# Patient Record
Sex: MALE | Race: BLACK OR AFRICAN AMERICAN | NOT HISPANIC OR LATINO | URBAN - METROPOLITAN AREA
[De-identification: names, ages, dates, MRNs, and addresses within clinical notes are randomized per-mention and may not be internally consistent; named-entity substitution may affect disease eponyms.]

---
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
## 2018-06-17 ENCOUNTER — EMERGENCY (EMERGENCY)
Facility: HOSPITAL | Age: 18
LOS: 1 days | Discharge: HOME | End: 2018-06-17
Attending: EMERGENCY MEDICINE
Payer: COMMERCIAL

## 2018-06-17 VITALS
HEIGHT: 67 IN | DIASTOLIC BLOOD PRESSURE: 79 MMHG | TEMPERATURE: 97 F | WEIGHT: 154.98 LBS | OXYGEN SATURATION: 100 % | SYSTOLIC BLOOD PRESSURE: 141 MMHG | HEART RATE: 55 BPM | RESPIRATION RATE: 16 BRPM

## 2018-06-17 RX ORDER — TETANUS TOXOID, REDUCED DIPHTHERIA TOXOID AND ACELLULAR PERTUSSIS VACCINE, ADSORBED 5; 2.5; 8; 8; 2.5 [IU]/.5ML; [IU]/.5ML; UG/.5ML; UG/.5ML; UG/.5ML
0.5 SUSPENSION INTRAMUSCULAR ONCE
Qty: 0 | Refills: 0 | Status: COMPLETED | OUTPATIENT
Start: 2018-06-17 | End: 2018-06-17

## 2018-06-17 RX ORDER — IBUPROFEN 200 MG
600 TABLET ORAL ONCE
Qty: 0 | Refills: 0 | Status: COMPLETED | OUTPATIENT
Start: 2018-06-17 | End: 2018-06-17

## 2018-06-17 RX ADMIN — Medication 600 MILLIGRAM(S): at 21:33

## 2018-06-17 RX ADMIN — TETANUS TOXOID, REDUCED DIPHTHERIA TOXOID AND ACELLULAR PERTUSSIS VACCINE, ADSORBED 0.5 MILLILITER(S): 5; 2.5; 8; 8; 2.5 SUSPENSION INTRAMUSCULAR at 21:32

## 2018-06-17 NOTE — ED ADULT NURSE NOTE - CHPI ED SYMPTOMS NEG
no purulent drainage/no redness/no red streaks/no bleeding at site/no fever/no vomiting/no drainage/no bleeding/no chills/no pain

## 2018-06-17 NOTE — ED ADULT NURSE NOTE - OBJECTIVE STATEMENT
pt is a 17 yr M presents with laceration to L thumb, pt was cleaning a glass when it broke and cut his finger. bleeding controlled. tdap not up to date. no medical hx. no deformity. cap refill < 3 secs

## 2018-06-18 PROBLEM — L30.9 DERMATITIS, UNSPECIFIED: Chronic | Status: ACTIVE | Noted: 2017-09-19

## 2018-06-18 NOTE — ED PROVIDER NOTE - OBJECTIVE STATEMENT
18yo male pt, no PMHx, ambulatory c/o left thumb laceration by broken glass at work prior to arrival. Denies other injuries. Denies sensory changes or weakness to extremities. Unknown TD.

## 2018-06-18 NOTE — ED PROVIDER NOTE - MEDICAL DECISION MAKING DETAILS
ANTONIO Hope MD: Pt is a 18 y/o with no sig PMHx who p/w c/o left thumb laceration. States that it happened while he was cleaning a glass that broke while at work. Endorses some paresthesias at the tip of the finger, no limitation in ROM. Last tetanus unknown. Plan: xray to r/o retained FB, Adacel, suture laceration and provide outpt f/u instructions.

## 2018-06-18 NOTE — ED PROVIDER NOTE - PHYSICAL EXAMINATION
NAD, VSS, Afebrile, + Left thumb, the volar aspect of tip 2cm superficial (vertical) laceration, no active bleeding. N/V- intact.

## 2020-12-12 ENCOUNTER — TRANSCRIPTION ENCOUNTER (OUTPATIENT)
Age: 20
End: 2020-12-12

## 2020-12-13 ENCOUNTER — EMERGENCY (EMERGENCY)
Facility: HOSPITAL | Age: 20
LOS: 1 days | Discharge: HOME | End: 2020-12-13
Attending: EMERGENCY MEDICINE
Payer: MEDICARE

## 2020-12-13 VITALS
TEMPERATURE: 98 F | DIASTOLIC BLOOD PRESSURE: 70 MMHG | WEIGHT: 167.99 LBS | HEART RATE: 55 BPM | OXYGEN SATURATION: 97 % | RESPIRATION RATE: 18 BRPM | SYSTOLIC BLOOD PRESSURE: 110 MMHG | HEIGHT: 67 IN

## 2020-12-13 LAB
ALBUMIN SERPL ELPH-MCNC: 4.7 G/DL — SIGNIFICANT CHANGE UP (ref 3.3–5)
ALP SERPL-CCNC: 62 U/L — SIGNIFICANT CHANGE UP (ref 40–120)
ALT FLD-CCNC: 14 U/L — SIGNIFICANT CHANGE UP (ref 10–45)
ANION GAP SERPL CALC-SCNC: 11 MMOL/L — SIGNIFICANT CHANGE UP (ref 5–17)
AST SERPL-CCNC: 15 U/L — SIGNIFICANT CHANGE UP (ref 10–40)
BASOPHILS # BLD AUTO: 0.03 K/UL — SIGNIFICANT CHANGE UP (ref 0–0.2)
BASOPHILS NFR BLD AUTO: 0.4 % — SIGNIFICANT CHANGE UP (ref 0–2)
BILIRUB SERPL-MCNC: 0.5 MG/DL — SIGNIFICANT CHANGE UP (ref 0.2–1.2)
BUN SERPL-MCNC: 9 MG/DL — SIGNIFICANT CHANGE UP (ref 7–23)
CALCIUM SERPL-MCNC: 9.7 MG/DL — SIGNIFICANT CHANGE UP (ref 8.4–10.5)
CHLORIDE SERPL-SCNC: 102 MMOL/L — SIGNIFICANT CHANGE UP (ref 96–108)
CO2 SERPL-SCNC: 26 MMOL/L — SIGNIFICANT CHANGE UP (ref 22–31)
CREAT SERPL-MCNC: 0.95 MG/DL — SIGNIFICANT CHANGE UP (ref 0.5–1.3)
CRP SERPL-MCNC: 0.76 MG/DL — HIGH (ref 0–0.4)
EOSINOPHIL # BLD AUTO: 0.16 K/UL — SIGNIFICANT CHANGE UP (ref 0–0.5)
EOSINOPHIL NFR BLD AUTO: 2.1 % — SIGNIFICANT CHANGE UP (ref 0–6)
GLUCOSE SERPL-MCNC: 104 MG/DL — HIGH (ref 70–99)
HCT VFR BLD CALC: 43.8 % — SIGNIFICANT CHANGE UP (ref 39–50)
HGB BLD-MCNC: 14 G/DL — SIGNIFICANT CHANGE UP (ref 13–17)
HIV 1 & 2 AB SERPL IA.RAPID: SIGNIFICANT CHANGE UP
IMM GRANULOCYTES NFR BLD AUTO: 0.1 % — SIGNIFICANT CHANGE UP (ref 0–1.5)
LYMPHOCYTES # BLD AUTO: 1.31 K/UL — SIGNIFICANT CHANGE UP (ref 1–3.3)
LYMPHOCYTES # BLD AUTO: 17.5 % — SIGNIFICANT CHANGE UP (ref 13–44)
MCHC RBC-ENTMCNC: 27.2 PG — SIGNIFICANT CHANGE UP (ref 27–34)
MCHC RBC-ENTMCNC: 32 GM/DL — SIGNIFICANT CHANGE UP (ref 32–36)
MCV RBC AUTO: 85 FL — SIGNIFICANT CHANGE UP (ref 80–100)
MONOCYTES # BLD AUTO: 0.56 K/UL — SIGNIFICANT CHANGE UP (ref 0–0.9)
MONOCYTES NFR BLD AUTO: 7.5 % — SIGNIFICANT CHANGE UP (ref 2–14)
NEUTROPHILS # BLD AUTO: 5.4 K/UL — SIGNIFICANT CHANGE UP (ref 1.8–7.4)
NEUTROPHILS NFR BLD AUTO: 72.4 % — SIGNIFICANT CHANGE UP (ref 43–77)
NRBC # BLD: 0 /100 WBCS — SIGNIFICANT CHANGE UP (ref 0–0)
PLATELET # BLD AUTO: 242 K/UL — SIGNIFICANT CHANGE UP (ref 150–400)
POTASSIUM SERPL-MCNC: 3.7 MMOL/L — SIGNIFICANT CHANGE UP (ref 3.5–5.3)
POTASSIUM SERPL-SCNC: 3.7 MMOL/L — SIGNIFICANT CHANGE UP (ref 3.5–5.3)
PROT SERPL-MCNC: 7.4 G/DL — SIGNIFICANT CHANGE UP (ref 6–8.3)
RBC # BLD: 5.15 M/UL — SIGNIFICANT CHANGE UP (ref 4.2–5.8)
RBC # FLD: 13.4 % — SIGNIFICANT CHANGE UP (ref 10.3–14.5)
SARS-COV-2 RNA SPEC QL NAA+PROBE: SIGNIFICANT CHANGE UP
SODIUM SERPL-SCNC: 139 MMOL/L — SIGNIFICANT CHANGE UP (ref 135–145)
WBC # BLD: 7.47 K/UL — SIGNIFICANT CHANGE UP (ref 3.8–10.5)
WBC # FLD AUTO: 7.47 K/UL — SIGNIFICANT CHANGE UP (ref 3.8–10.5)

## 2020-12-13 RX ORDER — SODIUM CHLORIDE 9 MG/ML
1000 INJECTION INTRAMUSCULAR; INTRAVENOUS; SUBCUTANEOUS ONCE
Refills: 0 | Status: COMPLETED | OUTPATIENT
Start: 2020-12-13 | End: 2020-12-13

## 2020-12-13 RX ORDER — IBUPROFEN 200 MG
600 TABLET ORAL ONCE
Refills: 0 | Status: COMPLETED | OUTPATIENT
Start: 2020-12-13 | End: 2020-12-13

## 2020-12-13 RX ADMIN — Medication 100 MILLIGRAM(S): at 15:07

## 2020-12-13 RX ADMIN — Medication 900 MILLIGRAM(S): at 15:30

## 2020-12-13 RX ADMIN — SODIUM CHLORIDE 1000 MILLILITER(S): 9 INJECTION INTRAMUSCULAR; INTRAVENOUS; SUBCUTANEOUS at 16:13

## 2020-12-13 RX ADMIN — Medication 600 MILLIGRAM(S): at 16:13

## 2020-12-13 RX ADMIN — Medication 600 MILLIGRAM(S): at 15:29

## 2020-12-13 RX ADMIN — SODIUM CHLORIDE 1000 MILLILITER(S): 9 INJECTION INTRAMUSCULAR; INTRAVENOUS; SUBCUTANEOUS at 15:07

## 2020-12-13 NOTE — ED PROVIDER NOTE - PATIENT PORTAL LINK FT
You can access the FollowMyHealth Patient Portal offered by [facility] by registering at the following website: [email or web address]. By joining [facility]’s FollowMyHealth portal, you will also be able to view your health information using other applications (apps) compatible with our system.

## 2020-12-13 NOTE — ED PROVIDER NOTE - NSFOLLOWUPCLINICS_GEN_ALL_ED_FT
[facility] General Internal Medicine  General Internal Medicine  [address] [id number]  Phone: [phone]  Fax:   Follow Up Time:

## 2020-12-13 NOTE — ED PROVIDER NOTE - OBJECTIVE STATEMENT
20 yr old with pain of the right middle finger, no hx of specific injury, he lifts weights and has eczema of his hands, it started 3 days ago, now not able to bend finger and it is starting to affect the fingers next to it, UTD immunizations.

## 2020-12-13 NOTE — ED PROVIDER NOTE - CLINICAL SUMMARY MEDICAL DECISION MAKING FREE TEXT BOX
Iva Rice MD  20 yr old male with hand pain, with an area of abscess on the palmar aspect of the 3rd finger, and with erythema of the dorsal aspect of the hand, with decreased range of motion concern for an abscess will do Xray to r/o fx, labs, antibiotics, will most likely require hand surgery consult.

## 2020-12-13 NOTE — ED PROVIDER NOTE - CARE PROVIDER_API CALL
Christi Stephens)  Plastic Surgery; Surgery  [address], [address]  [place] [address]  Phone: [phone]  Fax: [phone]  Follow Up Time:

## 2020-12-13 NOTE — ED ADULT NURSE NOTE - OBJECTIVE STATEMENT
20 year old male A&OX4 presents with right hand pain, swelling, and rash that began approximately 4 days ago. Patient reports pain and swelling began around the middle finger white side. Swelling has increased to affect the entire hand and has developed a rash on the same hand. Patient has difficulty with opening and closing hand. There appears to be a collection on the white side of the affected hand. Decreased sensation also reported. Denies fevers, chills, chest pain, shortness of breath, palpitations, nausea, vomiting.

## 2020-12-13 NOTE — ED PROVIDER NOTE - PROGRESS NOTE DETAILS
Lesa Perla PGY1: Pt seen by Hand surgery. I&D performed on abscess over R 3rd finger palmar aspect. Pus expressed. Wound left open, and packed. AG attg: patient signed out to me - hand abscess to be seen by hand sx for drainage. patient was evaluated by Dr. wilder who drained pus from wound. I personally spoke with patient and recommended cdu/obs for iv abx, however patient opting to go home instead. States he has a sick mom at home who needs his help and no one else is around to help. Patient undersatneds that worsening infection could result in compromise and possible loss of fingers/hand/arm/death etc. He is displaying capacity at this time. Had extensive conversation about how he is to adhere to abx, f/u with pmd, and return to ER if he develops worsening pain/weakness/fevers, etc.Will set patient up with pmd for further evaluation.

## 2020-12-13 NOTE — ED PROVIDER NOTE - PHYSICAL EXAMINATION
area of discoloration and flocculence on the palmar aspect of the 3rd finger, concern for an abscesses, and with erythema and swelling of the dorsal aspect of the hand, with decreased range of motion of the 3rd and the 2nd and 4th fingers.

## 2020-12-13 NOTE — CONSULT NOTE ADULT - SUBJECTIVE AND OBJECTIVE BOX
Patient evaluated earlier this evening in ED.    I+D performd R hand/base of R long finger-purulent drainage/culture sent.  Rec: admit to CDU for continued IV antibiotics and observation/soaks, ID consultation, Dermatology consultation.    Full consult note to be dictated.   Please call [phone] with additional questions.

## 2020-12-13 NOTE — ED PROVIDER NOTE - ATTENDING CONTRIBUTION TO CARE
I performed a history and physical exam of the patient and discussed their management with the resident. I reviewed the resident's note and agree with the documented findings and plan of care.  Iva Rice MD

## 2020-12-13 NOTE — ED PROVIDER NOTE - NSFOLLOWUPINSTRUCTIONS_ED_ALL_ED_FT
You have an infection in your hand. Please be sure to take and complete the course of antibiotics you were prescribed. Please follow up with Dr. Daniels and a primary care doctor (see attached) for further evaluation and treatment. Please also return to the ER immediately if you have worsening pain, weakness, change in sensation, fevers, persistent vomiting, or if you have any other concerns.    Please take tylenol 650 mg every 6-8 hours as needed for pain. Please do not exceed more than 4,000mg of Tylenol in a day  Please take Ibuprofen (Motrin) 600mg by mouth every 6 hours as needed for pain. Please take this medication with food.

## 2020-12-13 NOTE — ED PROVIDER NOTE - NS_EDPROVIDERDISPOUSERTYPE_ED_A_ED
Attending Attestation (For Attendings USE Only)... Tissue Cultured Epidermal Autograft Text: The defect edges were debeveled with a #15 scalpel blade.  Given the location of the defect, shape of the defect and the proximity to free margins a tissue cultured epidermal autograft was deemed most appropriate.  The graft was then trimmed to fit the size of the defect.  The graft was then placed in the primary defect and oriented appropriately.

## 2020-12-14 LAB — ERYTHROCYTE [SEDIMENTATION RATE] IN BLOOD: 16 MM/HR — HIGH (ref 0–15)

## 2020-12-14 NOTE — ED POST DISCHARGE NOTE - DETAILS
12/14/20: pt seen in ED for finger abscess, was I&Dd and packed by plastics. slightly elevated esr expected for diagnosis. appropriate care given in ED. no need for callback at this time - Ke Rosales PA-C 12/15/20: Prelim abscess culture few staph aureus pt d/cd on clinda will await final sens. -Daisy Pablo PA-C Verbalized Understanding

## 2020-12-15 LAB
-  AMPICILLIN/SULBACTAM: SIGNIFICANT CHANGE UP
-  CEFAZOLIN: SIGNIFICANT CHANGE UP
-  CLINDAMYCIN: SIGNIFICANT CHANGE UP
-  DAPTOMYCIN: SIGNIFICANT CHANGE UP
-  ERYTHROMYCIN: SIGNIFICANT CHANGE UP
-  GENTAMICIN: SIGNIFICANT CHANGE UP
-  LINEZOLID: SIGNIFICANT CHANGE UP
-  OXACILLIN: SIGNIFICANT CHANGE UP
-  PENICILLIN: SIGNIFICANT CHANGE UP
-  RIFAMPIN: SIGNIFICANT CHANGE UP
-  TETRACYCLINE: SIGNIFICANT CHANGE UP
-  TRIMETHOPRIM/SULFAMETHOXAZOLE: SIGNIFICANT CHANGE UP
-  VANCOMYCIN: SIGNIFICANT CHANGE UP
METHOD TYPE: SIGNIFICANT CHANGE UP

## 2020-12-18 LAB
CULTURE RESULTS: SIGNIFICANT CHANGE UP
ORGANISM # SPEC MICROSCOPIC CNT: SIGNIFICANT CHANGE UP
ORGANISM # SPEC MICROSCOPIC CNT: SIGNIFICANT CHANGE UP
SPECIMEN SOURCE: SIGNIFICANT CHANGE UP

## 2021-06-09 NOTE — ED PROVIDER NOTE - SKIN [+], MLM
Reviewed results and recommendations with patient.  Patient verbalized understanding of plan with no further questions or concerns at this time.   ITCH/RASH/dorsum of hands

## 2022-10-20 ENCOUNTER — OUTPATIENT (OUTPATIENT)
Dept: OUTPATIENT SERVICES | Facility: HOSPITAL | Age: 22
LOS: 1 days | Discharge: HOME | End: 2022-10-20

## 2022-10-20 DIAGNOSIS — F12.20 CANNABIS DEPENDENCE, UNCOMPLICATED: ICD-10-CM

## 2022-10-20 DIAGNOSIS — F33.0 MAJOR DEPRESSIVE DISORDER, RECURRENT, MILD: ICD-10-CM

## 2023-10-06 NOTE — ED ADULT NURSE NOTE - CHPI ED SYMPTOMS NEG
Left a message for patient to call back
no chills/no vomiting/no decreased eating/drinking/no fever/no bruising/no pain/no inflammation/no petechia/no scaly patches on skin
No
